# Patient Record
(demographics unavailable — no encounter records)

---

## 2017-10-11 NOTE — CT
CT ABDOMEN AND PELVIS WITH AND WITHOUT IV CONTRAST:

 

HISTORY: 

Hematuria.

 

FINDINGS: 

There are calcified granulomas in the right lower lung and the spleen.  No calcified gallstones are 
seen.  No free air or free fluid is noted in the abdomen or pelvis.  

 

The liver, pancreas, and adrenal glands appear normal.  No calculi are seen in the kidneys, ureters,
 or the urinary bladder.  No hydroureteral nephrosis is noted on either side.  No renal mass is iden
tified.  There is normal contrast excretion into the  ureters and the urinary bladder.  

 

No free air, free fluid, or lymphadenopathy is seen in the abdomen or pelvis.  There are vascular ca
lcifications without evidence of aneurysmal dilatation of the abdominal aorta.  Uterus is present.  
There are degenerative changes in the spine.

 

IMPRESSION: 

No CT evidence of urinary tract calculi/obstruction or renal mass.  

 

POS: CAT